# Patient Record
Sex: FEMALE | Race: WHITE | Employment: OTHER | URBAN - NONMETROPOLITAN AREA
[De-identification: names, ages, dates, MRNs, and addresses within clinical notes are randomized per-mention and may not be internally consistent; named-entity substitution may affect disease eponyms.]

---

## 2023-03-10 ENCOUNTER — APPOINTMENT (OUTPATIENT)
Dept: CT IMAGING | Age: 88
End: 2023-03-10
Payer: MEDICARE

## 2023-03-10 ENCOUNTER — HOSPITAL ENCOUNTER (EMERGENCY)
Age: 88
Discharge: HOSPICE | End: 2023-03-10
Attending: EMERGENCY MEDICINE
Payer: MEDICARE

## 2023-03-10 VITALS
TEMPERATURE: 98.3 F | HEART RATE: 60 BPM | SYSTOLIC BLOOD PRESSURE: 133 MMHG | OXYGEN SATURATION: 89 % | DIASTOLIC BLOOD PRESSURE: 50 MMHG | RESPIRATION RATE: 19 BRPM

## 2023-03-10 DIAGNOSIS — S09.90XA CLOSED HEAD INJURY, INITIAL ENCOUNTER: ICD-10-CM

## 2023-03-10 DIAGNOSIS — G93.5 BRAIN HERNIATION (HCC): ICD-10-CM

## 2023-03-10 DIAGNOSIS — S01.01XA LACERATION OF SCALP, INITIAL ENCOUNTER: ICD-10-CM

## 2023-03-10 DIAGNOSIS — S06.5XAA SUBDURAL HEMATOMA: Primary | ICD-10-CM

## 2023-03-10 PROBLEM — S06.5XAS: Status: ACTIVE | Noted: 2023-03-10

## 2023-03-10 LAB
ALBUMIN SERPL-MCNC: 3.9 G/DL (ref 3.5–5.2)
ALP BLD-CCNC: 106 U/L (ref 35–104)
ALT SERPL-CCNC: 10 U/L (ref 5–33)
ANION GAP SERPL CALCULATED.3IONS-SCNC: 13 MMOL/L (ref 7–19)
APTT: 33 SEC (ref 26–36.2)
AST SERPL-CCNC: 27 U/L (ref 5–32)
BASE EXCESS ARTERIAL: 5.9 MMOL/L (ref -2–2)
BASOPHILS ABSOLUTE: 0 K/UL (ref 0–0.2)
BASOPHILS RELATIVE PERCENT: 0.6 % (ref 0–1)
BILIRUB SERPL-MCNC: 0.6 MG/DL (ref 0.2–1.2)
BUN BLDV-MCNC: 15 MG/DL (ref 8–23)
CALCIUM SERPL-MCNC: 9.2 MG/DL (ref 8.2–9.6)
CARBOXYHEMOGLOBIN ARTERIAL: 3 % (ref 0–5)
CHLORIDE BLD-SCNC: 98 MMOL/L (ref 98–111)
CO2: 26 MMOL/L (ref 22–29)
CREAT SERPL-MCNC: 0.5 MG/DL (ref 0.5–0.9)
EOSINOPHILS ABSOLUTE: 0.2 K/UL (ref 0–0.6)
EOSINOPHILS RELATIVE PERCENT: 3.3 % (ref 0–5)
GFR SERPL CREATININE-BSD FRML MDRD: >60 ML/MIN/{1.73_M2}
GLUCOSE BLD-MCNC: 199 MG/DL (ref 70–99)
GLUCOSE BLD-MCNC: 202 MG/DL (ref 74–109)
HCO3 ARTERIAL: 31.2 MMOL/L (ref 22–26)
HCT VFR BLD CALC: 37.3 % (ref 37–47)
HEMOGLOBIN, ART, EXTENDED: 12.7 G/DL (ref 12–16)
HEMOGLOBIN: 13 G/DL (ref 12–16)
IMMATURE GRANULOCYTES #: 0 K/UL
INR BLD: 1.09 (ref 0.88–1.18)
LYMPHOCYTES ABSOLUTE: 2.3 K/UL (ref 1.1–4.5)
LYMPHOCYTES RELATIVE PERCENT: 34.2 % (ref 20–40)
MCH RBC QN AUTO: 32.7 PG (ref 27–31)
MCHC RBC AUTO-ENTMCNC: 34.9 G/DL (ref 33–37)
MCV RBC AUTO: 94 FL (ref 81–99)
METHEMOGLOBIN ARTERIAL: 0.9 %
MONOCYTES ABSOLUTE: 0.5 K/UL (ref 0–0.9)
MONOCYTES RELATIVE PERCENT: 7 % (ref 0–10)
NEUTROPHILS ABSOLUTE: 3.6 K/UL (ref 1.5–7.5)
NEUTROPHILS RELATIVE PERCENT: 54.5 % (ref 50–65)
O2 CONTENT ARTERIAL: 16.8 ML/DL
O2 DELIVERY DEVICE: ABNORMAL
O2 SAT, ARTERIAL: 93.7 %
O2 THERAPY: ABNORMAL
PCO2 ARTERIAL: 47 MMHG (ref 35–45)
PDW BLD-RTO: 12.9 % (ref 11.5–14.5)
PERFORMED ON: ABNORMAL
PH ARTERIAL: 7.43 (ref 7.35–7.45)
PLATELET # BLD: 174 K/UL (ref 130–400)
PMV BLD AUTO: 11.6 FL (ref 9.4–12.3)
PO2 ARTERIAL: 79 MMHG (ref 80–100)
POTASSIUM SERPL-SCNC: 3.1 MMOL/L (ref 3.5–5)
POTASSIUM, WHOLE BLOOD: 2.7
PROTHROMBIN TIME: 14.1 SEC (ref 12–14.6)
RBC # BLD: 3.97 M/UL (ref 4.2–5.4)
SAMPLE SOURCE: ABNORMAL
SARS-COV-2, NAAT: NOT DETECTED
SODIUM BLD-SCNC: 137 MMOL/L (ref 136–145)
TOTAL PROTEIN: 7.7 G/DL (ref 6.6–8.7)
WBC # BLD: 6.7 K/UL (ref 4.8–10.8)

## 2023-03-10 PROCEDURE — 80053 COMPREHEN METABOLIC PANEL: CPT

## 2023-03-10 PROCEDURE — 82962 GLUCOSE BLOOD TEST: CPT

## 2023-03-10 PROCEDURE — 70450 CT HEAD/BRAIN W/O DYE: CPT

## 2023-03-10 PROCEDURE — 85025 COMPLETE CBC W/AUTO DIFF WBC: CPT

## 2023-03-10 PROCEDURE — 36600 WITHDRAWAL OF ARTERIAL BLOOD: CPT

## 2023-03-10 PROCEDURE — 93005 ELECTROCARDIOGRAM TRACING: CPT | Performed by: EMERGENCY MEDICINE

## 2023-03-10 PROCEDURE — 96374 THER/PROPH/DIAG INJ IV PUSH: CPT

## 2023-03-10 PROCEDURE — 85610 PROTHROMBIN TIME: CPT

## 2023-03-10 PROCEDURE — 36415 COLL VENOUS BLD VENIPUNCTURE: CPT

## 2023-03-10 PROCEDURE — 82803 BLOOD GASES ANY COMBINATION: CPT

## 2023-03-10 PROCEDURE — 99284 EMERGENCY DEPT VISIT MOD MDM: CPT

## 2023-03-10 PROCEDURE — 6360000002 HC RX W HCPCS

## 2023-03-10 PROCEDURE — 85730 THROMBOPLASTIN TIME PARTIAL: CPT

## 2023-03-10 PROCEDURE — 72125 CT NECK SPINE W/O DYE: CPT

## 2023-03-10 PROCEDURE — 87635 SARS-COV-2 COVID-19 AMP PRB: CPT

## 2023-03-10 RX ORDER — ONDANSETRON 2 MG/ML
INJECTION INTRAMUSCULAR; INTRAVENOUS
Status: COMPLETED
Start: 2023-03-10 | End: 2023-03-10

## 2023-03-10 RX ORDER — ONDANSETRON 2 MG/ML
4 INJECTION INTRAMUSCULAR; INTRAVENOUS ONCE
Status: COMPLETED | OUTPATIENT
Start: 2023-03-10 | End: 2023-03-10

## 2023-03-10 RX ADMIN — ONDANSETRON 4 MG: 2 INJECTION INTRAMUSCULAR; INTRAVENOUS at 10:05

## 2023-03-10 NOTE — ED NOTES
Patient had another episode of emesis. Pt suctioned again. Oxygen now mid 80% on RA. Dr. Vieira Felt notified, advised no oxygen if patient not in distress.       Bill Maurer RN  03/10/23 5912

## 2023-03-10 NOTE — PROGRESS NOTES
Adm forms signed. [facility] call [phone] to give report then transfer the pt to the [facility] 75.. Emotional and sc support provided.

## 2023-03-10 NOTE — ED NOTES
[address] [place] [address],[address] [place] [address], [address] [place] [address]  03/10/23 5258

## 2023-03-10 NOTE — ED NOTES
Pt to [address], RN  03/10/23 [address], RN  03/10/23 4395

## 2023-03-10 NOTE — ED NOTES
[facility] for Dr. Raina Reardon. Adelfo requests [employer] notification.       Kumar Cavanaugh  03/10/23 2596

## 2023-03-10 NOTE — ED NOTES
Called to bedside by family for patient having episode of emesis. Pt suctioned. Dr. Severo Laurence made aware, VO for zofran.       Roger Rai, RN  03/10/23 0457

## 2023-03-10 NOTE — ED PROVIDER NOTES
[facility] EMERGENCY DEPT  eMERGENCY dEPARTMENT eNCOUnter      Pt Name: Tigist Weller  MRN: [id number]  [place] 4/29/1930  Date of evaluation: 3/10/2023  Provider: Annamaria Varghese MD    CHIEF COMPLAINT       Chief Complaint   Patient presents with    Aurora Diss backwards and hit back of head with lac. Posturing pta per ems. Responsive to painful stimuli. HISTORY OF PRESENT ILLNESS   (Location/Symptom, Timing/Onset,Context/Setting, Quality, Duration, Modifying Factors, Severity)  Note limiting factors. Tigist Weller is a 80 y.o. female who presents to the emergency department for evaluation of a fall that occurred at home. Patient arrived here to ED via EMS. All history has been obtained from EMS personnel. States that patient was minimally responsive upon their arrival.  Apparently she was doing some posturing with questionable seizure-like activity in route. She received a dose of Valium. She had had a fall at home about 1 hour ago and was initially awake after the fall and seemed okay and then family states that she became more confused and altered and fell to the floor again. They noted a laceration to the right posterior scalp area. Unclear at this time if he is on any anticoagulant medications. Patient responsive to verbal stimuli here in the ED. EMS reported that patient was DNR according to family. HPI    NursingNotes were reviewed. REVIEW OF SYSTEMS    (2-9 systems for level 4, 10 or more for level 5)     Review of Systems   Unable to perform ROS: Mental status change          PAST MEDICALHISTORY   No past medical history on file. SURGICAL HISTORY     No past surgical history on file. CURRENT MEDICATIONS     Previous Medications    No medications on file       ALLERGIES     Patient has no allergy information on record. FAMILY HISTORY     No family history on file. SOCIAL HISTORY       Social History     Socioeconomic History    Marital status:         SCREENINGS   NIH Stroke Scale  Interval: Baseline  Level of Consciousness (1a): Responds only with reflex motor or autonomic effects  LOC Questions (1b): Answers neither question correctly  LOC Commands (1c): Performs neither task correctly  Best Gaze (2): Normal (pupils fixed )  Visual (3): No visual loss  Facial Palsy (4): Normal symmetrical movement  Motor Arm, Left (5a): No movement  Motor Arm, Right (5b): No movement  Motor Leg, Left (6a): No movement  Motor Leg, Right (6b): No movement  Limb Ataxia (7): Absent  Sensory (8): (!) Severe to total loss  Best Language (9): Mute  Dysarthria (10): Severe, unintelligible slurring or mute  Extinction and Inattention (11): (!) Profound alberto-inattention or extinction to more than one modality  Total: 32Glasgow Coma Scale  Eye Opening: None  Best Verbal Response: None  Best Motor Response: Flexion to pain  [place] Coma Scale Score: 5        PHYSICAL EXAM    (up to 7 for level 4, 8 or more for level 5)     ED Triage Vitals [03/10/23 0915]   BP Temp Temp Source Heart Rate Resp SpO2 Height Weight   (!) 125/55 98.3 °F (36.8 °C) Axillary 82 18 94 % -- --       Physical Exam  Vitals and nursing note reviewed. Constitutional:       Interventions: Cervical collar in place. HENT:      Head:        Comments: Scalp laceration     Mouth/Throat:      Mouth: Mucous membranes are not dry. Pharynx: No posterior oropharyngeal erythema. Eyes:      Comments: Pupils are 1 to 2 mm in minimally reactive bilaterally. Neck:      Trachea: No tracheal deviation. Cardiovascular:      Rate and Rhythm: Normal rate and regular rhythm. Pulses: Normal pulses. Heart sounds: Normal heart sounds. No murmur heard. Pulmonary:      Effort: Bradypnea and respiratory distress present. Breath sounds: No stridor. Comments: Snoring respirations are present  Abdominal:      General: There is no distension. Palpations: Abdomen is soft. Tenderness: There is no abdominal tenderness.  There is no guarding. Musculoskeletal:      Right shoulder: No swelling. Normal range of motion. Left shoulder: No swelling. Normal range of motion. Right elbow: Normal range of motion. Left elbow: Normal range of motion. Right hip: No deformity. Normal range of motion. Left hip: No deformity. Normal range of motion. Right knee: Normal range of motion. Left knee: Normal range of motion. Right lower leg: No edema. Left lower leg: No edema. Skin:     Capillary Refill: Capillary refill takes less than 2 seconds. Coloration: Skin is not pale. Findings: No rash. Neurological:      Mental Status: She is lethargic. GCS: GCS eye subscore is 1. GCS verbal subscore is 1. GCS motor subscore is 2. Comments: Patient with depressed mental status. GCS 4.  No other elements of neurological exam able to be obtained. There is no obvious facial droop identified. DIAGNOSTIC RESULTS     EKG: All EKG's areinterpreted by the Emergency Department Physician who either signs or Co-signs this chart in the absence of a cardiologist.    2030: Paced rhythm at a rate of 67. RADIOLOGY:  Non-plain film images such as CT, Ultrasound and MRI are read by the radiologist. Plain radiographic images are visualized and preliminarily interpreted bythe emergency physician with the below findings:        CT CERVICAL SPINE WO CONTRAST   Final Result   No acute osseous abnormality of the cervical spine. CT HEAD WO CONTRAST   Final Result   1. Acute, large left and small right subdural hemorrhage. 2.Severe right 2.3 cm midline shift with uncal and transfalcine herniation. 3.Right scalp hematoma posteriorly.               LABS:  Labs Reviewed   COMPREHENSIVE METABOLIC PANEL - Abnormal; Notable for the following components:       Result Value    Potassium 3.1 (*)     Glucose 202 (*)     Alkaline Phosphatase 106 (*)     All other components within normal limits   CBC WITH AUTO DIFFERENTIAL - Abnormal; Notable for the following components:    RBC 3.97 (*)     MCH 32.7 (*)     All other components within normal limits   BLOOD GAS, ARTERIAL - Abnormal; Notable for the following components:    pCO2, Arterial 47.0 (*)     pO2, Arterial 79.0 (*)     HCO3, Arterial 31.2 (*)     Base Excess, Arterial 5.9 (*)     All other components within normal limits   POCT GLUCOSE - Abnormal; Notable for the following components:    POC Glucose 199 (*)     All other components within normal limits   COVID-19, RAPID   PROTIME-INR   APTT       All other labs were within normal range or not returned as of this dictation. EMERGENCY DEPARTMENT COURSE and DIFFERENTIAL DIAGNOSIS/MDM:   Vitals:    Vitals:    03/10/23 0931 03/10/23 0936 03/10/23 0946 03/10/23 0951   BP: 114/66   (!) 118/55   Pulse: 67 75 61 60   Resp: 16 13 16 17   Temp:       TempSrc:       SpO2: 92% 92% 90% 92%       MDM     Amount and/or Complexity of Data Reviewed  Clinical lab tests: ordered and reviewed  Tests in the radiology section of CPT®: ordered and reviewed  Obtain history from someone other than the patient: yes  Discuss the patient with other providers: yes  Independent visualization of images, tracings, or specimens: yes    I have independently reviewed patient's laboratory studies, EKG and CT imaging performed while she is present here in the emergency department. I have obtained additional history from EMS personnel and patient's family once they arrived. Patient had a fall earlier today and then developed depressed mental status and confusion. Review of her CT reveals a large left-sided and small right-sided subdural hematoma with 2.3 mm of midline shift with evidence of uncal and transfalcine herniation. Patient's pH is 7.4 with a PCO2 of 47.   PT/INR is normal.  Her WBC count is normal.    Upon family arrival here in the ED I have had a lengthy discussion with them regarding the critical nature of her injuries and CT imaging findings. They outlined that she is a DNR and would not want any type of aggressive intervention or surgery. Patient is 80years old at this time has a large subdural hematoma resulting in midline shift and significantly depressed mental status with snoring respirations. I have spoken with patient's family regarding the inpatient hospice unit and they are agreeable with this plan of care. 1017: Case was discussed with Dr. Mello Bonilla regarding admission to the hospice care unit for acute symptom management. PROCEDURES:  Unless otherwise noted below, none     Procedures    CRITICAL CARE TIME   Total Critical Care time was 42 minutes, excluding separately reportable procedures. There was a high probability of clinically significant/life threatening deterioration in the patient's condition which required my urgent intervention. Patient required my immediate presence at the bedside. Multiple reexaminations were undertaken throughout ED course of care. Time was spent reviewing plan of care with ED nursing staff. Time is inclusive of  and clinical documentation. FINAL IMPRESSION      1. Subdural hematoma    2. Brain herniation (HCC)    3. Closed head injury, initial encounter    4.  Laceration of scalp, initial encounter          DISPOSITION/PLAN   DISPOSITION  Dischargd to [facility]      (Please note that portions of this note were completed with a voice recognition program.  Efforts were made to edit thedictations but occasionally words are mis-transcribed.)    Quyen Hearn MD (electronically signed)  Attending Emergency Physician         Quyen Hearn MD  03/10/23 1017

## 2023-03-11 LAB
EKG P AXIS: NORMAL DEGREES
EKG P-R INTERVAL: NORMAL MS
EKG Q-T INTERVAL: 458 MS
EKG QRS DURATION: 166 MS
EKG QTC CALCULATION (BAZETT): 468 MS
EKG T AXIS: 95 DEGREES

## 2023-03-11 PROCEDURE — 93010 ELECTROCARDIOGRAM REPORT: CPT | Performed by: INTERNAL MEDICINE
